# Patient Record
Sex: FEMALE | Race: WHITE | Employment: FULL TIME
[De-identification: names, ages, dates, MRNs, and addresses within clinical notes are randomized per-mention and may not be internally consistent; named-entity substitution may affect disease eponyms.]

---

## 2024-10-07 ENCOUNTER — HOSPITAL ENCOUNTER (OUTPATIENT)
Facility: HOSPITAL | Age: 44
Setting detail: RECURRING SERIES
Discharge: HOME | End: 2024-10-10
Payer: COMMERCIAL

## 2024-10-07 PROCEDURE — 97162 PT EVAL MOD COMPLEX 30 MIN: CPT

## 2024-10-07 NOTE — THERAPY EVALUATION
perform skills, and interest  Persons(s) to be included in education: patient (P)  Barriers to Learning/Limitations: none  Measures taken if barriers to learning present: n/a  Patient Goal (s): \"less pain, more flexibility\"  Patient Self Reported Health Status: fair  Rehabilitation Potential: good    Short Term Goals: To be accomplished in 2 WEEKS  1.  Pt will be educated in/compliant with appropriate HEP to decrease pain, increase ROM, increase strength and return pt to PLOF.    Status at last note/certification: issued at SOC  Current:     2. Pt will increase b/l SB ROM by >/= 10 deg in order to decrease neck pain with ADL's  Status at last note/certification: R 24 (p!), L 38  Current:     3. Pt will improve b/l AC to table to </= 2 inches for decreased postural strain with sitting/standing ADL's.  Status at last note/certification: b/l 4 inches  Current:       Long Term Goals: To be accomplished in 8 WEEKS  1. Pt will improve NDI score to </= 15% to demo a significant improvement in functional activity tolerance.  Status at last note/certification: 32  Current:    2. Pt will demonstrate good form with deep c/s neck flexor endurance >/=25\" for decreased cervicalgia with ADL's.  Status at last note/certification: 15\" with Fair- form (engages SCM immediately)  Current:     3. Pt will improve b/l mid/low trap strength by at least 1/3 MMT for improved axial stability with prolonged standing ADL's.  Status at last note/certification: Mid trap R 4/5, L 4/5, Low trap R 3/5, L 3+/5  Current:         Frequency / Duration: Patient to be seen 2 times per week for 8 WEEKS    Patient/ Caregiver education and instruction: Diagnosis, prognosis, self care, activity modification, and exercises [x]  Plan of care has been reviewed with PTA    Certification Period: n/a    DON JAQUEZ, PT       10/7/2024       8:20 AM    Payor: ROSI / Plan: [employer] / Product Type: *No Product type* /     No Physician

## 2024-10-07 NOTE — PROGRESS NOTES
PHYSICAL / OCCUPATIONAL THERAPY - DAILY TREATMENT NOTE (updated )  For Eval visit    Patient Name: Felicita Meyer    Date: 10/7/2024    : 1980  Insurance: Payor: ROSI / Plan: [employer] / Product Type: *No Product type* /      Patient  verified yes     Visit #   Current / Total 1 16   Time   In / Out 12:20 12:48   Pain   In / Out 2 2   Subjective Functional Status/Changes: See POC     TREATMENT AREA =  see POC    OBJECTIVE      28 min   Eval - untimed                      Therapeutic Procedures:    Tx Min Billable or 1:1 Min (if diff from Tx Min) Procedure, Rationale, Specifics   x  [id number] Self Care/Home Management (timed):  improve patient knowledge and understanding of diagnosis/prognosis and physical therapy expectations, procedures and progression  to improve patient's ability to progress to PLOF and address remaining functional goals.  (see flow sheet as applicable)     Details if applicable:              Details if applicable:            Details if applicable:            Details if applicable:       [facility] Totals Reminder: bill using total billable min of TIMED therapeutic procedures (example: do not include dry needle or estim unattended, both untimed codes, in totals to left)  8-22 min = 1 unit; 23-37 min = 2 units; 38-52 min = 3 units; 53-67 min = 4 units; 68-82 min = 5 units   Total Total     [x]  Patient Education billed concurrently with other procedures   [x] Review HEP    [] Progressed/Changed HEP, detail:    [] Other detail:       Objective Information/Functional Measures/Assessment    See POC    Patient will continue to benefit from skilled PT / OT services to modify and progress therapeutic interventions, analyze and address functional mobility deficits, analyze and address ROM deficits, analyze and address strength deficits, analyze and address soft tissue restrictions, analyze and cue for proper movement patterns, analyze and modify for postural abnormalities, and

## 2024-10-08 NOTE — PROGRESS NOTES
PHYSICAL / OCCUPATIONAL THERAPY - DAILY TREATMENT NOTE    Patient Name: Felicita Meyer    Date: 10/9/2024    : 1980  Insurance: Payor: ROSI / Plan: [employer] / Product Type: *No Product type* /      Patient  verified Yes     Visit #   Current / Total 2 16   Time   In / Out 1221 104   Pain   In / Out 3 3   Subjective Functional Status/Changes: Pt reports pain along the right side of her neck today. States she has been doing her exercises at home.     TREATMENT AREA =  Cervicalgia [M54.2]  Other low back pain [M54.59]     OBJECTIVE         Therapeutic Procedures:    Tx Min Billable or 1:1 Min (if diff from Tx Min) Procedure, Rationale, Specifics   27 27 [id number] Therapeutic Exercise (timed):  increase ROM, strength, coordination, balance, and proprioception to improve patient's ability to progress to PLOF and address remaining functional goals. (see flow sheet as applicable)     Details if applicable:       8 8 58743 Manual Therapy (timed):  decrease pain, increase ROM, increase tissue extensibility, and decrease trigger points to improve patient's ability to progress to PLOF and address remaining functional goals.  The manual therapy interventions were performed at a separate and distinct time from the therapeutic activities interventions . (see flow sheet as applicable)     Details if applicable:  STM R UT, unique, SOR in supine   8 8 64004 Self Care/Home Management (timed):  improve patient knowledge and understanding of physical therapy expectations, procedures and progression  to improve patient's ability to progress to PLOF and address remaining functional goals.  (see flow sheet as applicable)     Details if applicable:  Pt education, HEP review and demo          Details if applicable:            Details if applicable:     43 43 [facility] Totals Reminder: bill using total billable min of TIMED therapeutic procedures (example: do not include dry needle or estim unattended, both untimed

## 2024-10-09 ENCOUNTER — HOSPITAL ENCOUNTER (OUTPATIENT)
Facility: HOSPITAL | Age: 44
Setting detail: RECURRING SERIES
Discharge: HOME | End: 2024-10-12
Payer: COMMERCIAL

## 2024-10-09 PROCEDURE — 97140 MANUAL THERAPY 1/> REGIONS: CPT

## 2024-10-09 PROCEDURE — 97535 SELF CARE MNGMENT TRAINING: CPT

## 2024-10-09 PROCEDURE — 97110 THERAPEUTIC EXERCISES: CPT

## 2024-10-14 NOTE — PROGRESS NOTES
ADL's.  Status at last note/certification: 15\" with Fair- form (engages SCM immediately)  Current:     3. Pt will improve b/l mid/low trap strength by at least 1/3 MMT for improved axial stability with prolonged standing ADL's.  Status at last note/certification: Mid trap R 4/5, L 4/5, Low trap R 3/5, L 3+/5  Current:  10/15/24: Progressing, added prone M, T, Y    Next PN/ RC due 11/7/24   Auth due (visit number/ date) (10 visits; 1/4/25)    PLAN  - Continue Plan of Care  - Upgrade activities as tolerated    Aelx Linda PTA    10/15/2024    8:10 AM  If an interpreting service was utilized for treatment of this patient, the contents of this document represent the material reviewed with the patient via the .     Future Appointments   Date Time Provider Department Center   10/15/2024  1:00 PM Alex Linda PTA [facility]   10/18/2024  1:00 PM Alex Linda PTA [facility]   10/21/2024 12:20 PM Isauro Diaz, PT [facility]   10/23/2024 12:20 PM Hazel Cano PT [facility]   10/30/2024 12:20 PM Isauro Diaz PT [facility]   11/1/2024 12:20 PM Alex Linda PTA [facility]

## 2024-10-15 ENCOUNTER — HOSPITAL ENCOUNTER (OUTPATIENT)
Facility: HOSPITAL | Age: 44
Setting detail: RECURRING SERIES
Discharge: HOME | End: 2024-10-18
Payer: COMMERCIAL

## 2024-10-15 PROCEDURE — 97110 THERAPEUTIC EXERCISES: CPT

## 2024-10-15 PROCEDURE — 97140 MANUAL THERAPY 1/> REGIONS: CPT

## 2024-10-18 ENCOUNTER — APPOINTMENT (OUTPATIENT)
Facility: HOSPITAL | Age: 44
End: 2024-10-18
Payer: COMMERCIAL

## 2024-10-21 ENCOUNTER — HOSPITAL ENCOUNTER (OUTPATIENT)
Facility: HOSPITAL | Age: 44
Setting detail: RECURRING SERIES
Discharge: HOME | End: 2024-10-24
Payer: COMMERCIAL

## 2024-10-21 PROCEDURE — 97110 THERAPEUTIC EXERCISES: CPT

## 2024-10-21 PROCEDURE — 97140 MANUAL THERAPY 1/> REGIONS: CPT

## 2024-10-21 NOTE — PROGRESS NOTES
PHYSICAL / OCCUPATIONAL THERAPY - DAILY TREATMENT NOTE    Patient Name: Felicita Meyer    Date: 10/21/2024    : 1980  Insurance: Payor: ROSI / Plan: [employer] / Product Type: *No Product type* /      Patient  verified Yes     Visit #   Current / Total 4 16   Time   In / Out 1219 1249   Pain   In / Out 2/10 1/10   Subjective Functional Status/Changes: Patient reports she is feeling sore on the R side of her neck as well as the top of her shoulders. Patient denies complications since her LV. Patient requesting to be done with her appointment by 12:50 as she needs to go to a meeting.      TREATMENT AREA =  Cervicalgia [M54.2]  Other low back pain [M54.59]     OBJECTIVE         Therapeutic Procedures:    Tx Min Billable or 1:1 Min (if diff from Tx Min) Procedure, Rationale, Specifics   15 70 38948 Therapeutic Exercise (timed):  increase ROM, strength, coordination, balance, and proprioception to improve patient's ability to progress to PLOF and address remaining functional goals. (see flow sheet as applicable)     Details if applicable:       15 15 84314 Manual Therapy (timed):  decrease pain, increase ROM, increase tissue extensibility, and decrease trigger points to improve patient's ability to progress to PLOF and address remaining functional goals.  The manual therapy interventions were performed at a separate and distinct time from the therapeutic activities interventions . (see flow sheet as applicable)     Details if applicable:  STM to R cervical paraspinals, R scalenes, cervical spine joint mobs to promote rotation and SB; manual cervical traction all performed in supine          Details if applicable:            Details if applicable:            Details if applicable:     30 30 [facility] Totals Reminder: bill using total billable min of TIMED therapeutic procedures (example: do not include dry needle or estim unattended, both untimed codes, in totals to left)  8-22 min = 1 unit;

## 2024-10-22 NOTE — PROGRESS NOTES
PHYSICAL / OCCUPATIONAL THERAPY - DAILY TREATMENT NOTE    Patient Name: Felicita Meyer    Date: 10/23/2024    : 1980  Insurance: Payor: ROSI / Plan: [employer] / Product Type: *No Product type* /      Patient  verified Yes     Visit #   Current / Total 5 16   Time   In / Out 1220 105   Pain   In / Out 0 1   Subjective Functional Status/Changes: Pt reports no neck pain today. States it only hurts when she turns it to the side.     TREATMENT AREA =  Cervicalgia [M54.2]  Other low back pain [M54.59]     OBJECTIVE         Therapeutic Procedures:    Tx Min Billable or 1:1 Min (if diff from Tx Min) Procedure, Rationale, Specifics   35 35 26934 Therapeutic Exercise (timed):  increase ROM, strength, coordination, balance, and proprioception to improve patient's ability to progress to PLOF and address remaining functional goals. (see flow sheet as applicable)     Details if applicable:       10 10 26140 Manual Therapy (timed):  decrease pain, increase ROM, increase tissue extensibility, and decrease trigger points to improve patient's ability to progress to PLOF and address remaining functional goals.  The manual therapy interventions were performed at a separate and distinct time from the therapeutic activities interventions . (see flow sheet as applicable)     Details if applicable:  STM R UT, unique, SOR in supine           Details if applicable:            Details if applicable:            Details if applicable:     45 45 [facility] Totals Reminder: bill using total billable min of TIMED therapeutic procedures (example: do not include dry needle or estim unattended, both untimed codes, in totals to left)  8-22 min = 1 unit; 23-37 min = 2 units; 38-52 min = 3 units; 53-67 min = 4 units; 68-82 min = 5 units   Total Total     [x]  Patient Education billed concurrently with other procedures   [x] Review HEP    [] Progressed/Changed HEP, detail:    [] Other detail:       Objective

## 2024-10-23 ENCOUNTER — HOSPITAL ENCOUNTER (OUTPATIENT)
Facility: HOSPITAL | Age: 44
Setting detail: RECURRING SERIES
Discharge: HOME | End: 2024-10-26
Payer: COMMERCIAL

## 2024-10-23 PROCEDURE — 97140 MANUAL THERAPY 1/> REGIONS: CPT

## 2024-10-23 PROCEDURE — 97110 THERAPEUTIC EXERCISES: CPT

## 2024-10-30 ENCOUNTER — HOSPITAL ENCOUNTER (OUTPATIENT)
Facility: HOSPITAL | Age: 44
Setting detail: RECURRING SERIES
Discharge: HOME | End: 2024-11-02
Payer: COMMERCIAL

## 2024-10-30 PROCEDURE — 97530 THERAPEUTIC ACTIVITIES: CPT

## 2024-10-30 PROCEDURE — 97110 THERAPEUTIC EXERCISES: CPT

## 2024-10-30 PROCEDURE — 97140 MANUAL THERAPY 1/> REGIONS: CPT

## 2024-10-30 NOTE — PROGRESS NOTES
PHYSICAL / OCCUPATIONAL THERAPY - DAILY TREATMENT NOTE    Patient Name: Felicita Meyer    Date: 10/30/2024    : 1980  Insurance: Payor: ROSI / Plan: [employer] / Product Type: *No Product type* /      Patient  verified Yes     Visit #   Current / Total 6 16   Time   In / Out 1220 1259   Pain   In / Out 0/10 1/10   Subjective Functional Status/Changes: Patient reports her neck has been feeling better overall since starting therapy. Patient denies any complications since her LV.      TREATMENT AREA =  Cervicalgia [M54.2]  Other low back pain [M54.59]     OBJECTIVE         Therapeutic Procedures:    Tx Min Billable or 1:1 Min (if diff from Tx Min) Procedure, Rationale, Specifics   17 17 97473 Therapeutic Exercise (timed):  increase ROM, strength, coordination, balance, and proprioception to improve patient's ability to progress to PLOF and address remaining functional goals. (see flow sheet as applicable)     Details if applicable:       10 10 22444 Therapeutic Activity (timed):  use of dynamic activities replicating functional movements to increase ROM, strength, coordination, balance, and proprioception in order to improve patient's ability to progress to PLOF and address remaining functional goals.  (see flow sheet as applicable)     Details if applicable:      33809 Manual Therapy (timed):  decrease pain, increase ROM, increase tissue extensibility, and decrease trigger points to improve patient's ability to progress to PLOF and address remaining functional goals.  The manual therapy interventions were performed at a separate and distinct time from the therapeutic activities interventions . (see flow sheet as applicable)     Details if applicable:  STM to R cervical paraspinals, R scalenes; cervical spine rotation PROM; manual cervical traction all performed in supine          Details if applicable:            Details if applicable:     39 39 [facility] Totals Reminder: bill using

## 2024-11-01 ENCOUNTER — HOSPITAL ENCOUNTER (OUTPATIENT)
Facility: HOSPITAL | Age: 44
Setting detail: RECURRING SERIES
Discharge: HOME | End: 2024-11-04
Payer: COMMERCIAL

## 2024-11-01 PROCEDURE — 97530 THERAPEUTIC ACTIVITIES: CPT

## 2024-11-01 PROCEDURE — 97110 THERAPEUTIC EXERCISES: CPT

## 2024-11-01 PROCEDURE — 97140 MANUAL THERAPY 1/> REGIONS: CPT

## 2024-11-01 NOTE — PROGRESS NOTES
PHYSICAL / OCCUPATIONAL THERAPY - DAILY TREATMENT NOTE    Patient Name: Felicita Meyer    Date: 2024    : 1980  Insurance: Payor: ROSI / Plan: [employer] / Product Type: *No Product type* /      Patient  verified Yes     Visit #   Current / Total 7 16   Time   In / Out 1220 108   Pain   In / Out 0 0   Subjective Functional Status/Changes: Pt reports no neck pain today. Reports improvement since starting therapy.     TREATMENT AREA =  Cervicalgia [M54.2]  Other low back pain [M54.59]     OBJECTIVE         Therapeutic Procedures:    Tx Min Billable or 1:1 Min (if diff from Tx Min) Procedure, Rationale, Specifics   25 25 74646 Therapeutic Exercise (timed):  increase ROM, strength, coordination, balance, and proprioception to improve patient's ability to progress to PLOF and address remaining functional goals. (see flow sheet as applicable)     Details if applicable:  see flow sheet     15 15 62162 Therapeutic Activity (timed):  use of dynamic activities replicating functional movements to increase ROM, strength, coordination, balance, and proprioception in order to improve patient's ability to progress to PLOF and address remaining functional goals.  (see flow sheet as applicable)     Details if applicable:  see flow sheet   8 8 91609 Manual Therapy (timed):  decrease pain, increase ROM, increase tissue extensibility, and decrease trigger points to improve patient's ability to progress to PLOF and address remaining functional goals.  The manual therapy interventions were performed at a separate and distinct time from the therapeutic activities interventions . (see flow sheet as applicable)     Details if applicable:  STM to R cervical paraspinals, R scalenes; cervical spine rotation PROM; manual cervical traction all performed in supine           Details if applicable:            Details if applicable:     48 48 [facility] Totals Reminder: bill using total billable min of TIMED therapeutic

## 2024-11-06 ENCOUNTER — HOSPITAL ENCOUNTER (OUTPATIENT)
Facility: HOSPITAL | Age: 44
Setting detail: RECURRING SERIES
Discharge: HOME | End: 2024-11-09
Payer: COMMERCIAL

## 2024-11-06 PROCEDURE — 97140 MANUAL THERAPY 1/> REGIONS: CPT

## 2024-11-06 PROCEDURE — 97530 THERAPEUTIC ACTIVITIES: CPT

## 2024-11-06 PROCEDURE — 97110 THERAPEUTIC EXERCISES: CPT

## 2024-11-06 NOTE — PROGRESS NOTES
PHYSICAL / OCCUPATIONAL THERAPY - DAILY TREATMENT NOTE    Patient Name: Felicita Meyer    Date: 2024    : 1980  Insurance: Payor: ROSI / Plan: [employer] / Product Type: *No Product type* /      Patient  verified Yes     Visit #   Current / Total 8 16   Time   In / Out 1220 107   Pain   In / Out 3/10 2/10   Subjective Functional Status/Changes: Pt reported increased c/s pain over the weekend. States she does not know how the pain started but reports it could have been from BU KB carries added last visit.      TREATMENT AREA =  Cervicalgia [M54.2]  Other low back pain [M54.59]     OBJECTIVE         Therapeutic Procedures:    Tx Min Billable or 1:1 Min (if diff from Tx Min) Procedure, Rationale, Specifics   8 8 60581 Therapeutic Exercise (timed):  increase ROM, strength, coordination, balance, and proprioception to improve patient's ability to progress to PLOF and address remaining functional goals. (see flow sheet as applicable)     Details if applicable:  see flow sheet     31 47 51606 Therapeutic Activity (timed):  use of dynamic activities replicating functional movements to increase ROM, strength, coordination, balance, and proprioception in order to improve patient's ability to progress to PLOF and address remaining functional goals.  (see flow sheet as applicable)     Details if applicable:  Reassessment, NDI   8 8 39444 Manual Therapy (timed):  decrease pain, increase ROM, increase tissue extensibility, and decrease trigger points to improve patient's ability to progress to PLOF and address remaining functional goals.  The manual therapy interventions were performed at a separate and distinct time from the therapeutic activities interventions . (see flow sheet as applicable)     Details if applicable:  STM to R cervical paraspinals, R scalenes; cervical spine rotation PROM; manual cervical traction all performed in supine          Details if applicable:            Details if

## 2024-11-06 NOTE — THERAPY RECERTIFICATION
[facility]  [address] [place] [address]  Phone: [phone]   [phone]  PHYSICAL THERAPY PROGRESS NOTE  Patient Name: Felicita Meyer : 1980   Treatment/Medical Diagnosis: Cervicalgia [M54.2]  Other low back pain [M54.59]   Referral Source: Venita Aguilar,*     Date of Initial Visit: 2024 Attended Visits: 8 Missed Visits: 0     SUMMARY OF TREATMENT  Physical therapy has consisted of therapeutic exercises for increased c/s strength, ROM, and flexibility. Therapeutic activities performed to improve functional activities, model real life movements and performance specific to the patient's need with supervision to return the patient to their PLOF.  Manual therapy to c/s for decreased muscle hypertonicity and increased ROM/flexibility. Pt education provided regarding HEP.    CURRENT STATUS  Pt reports 30% improvement since starting therapy. Pt has attended 8 visits since start of therapy and has missed 0 visits. NDI has improved from 32% to 26% indicating an improvement in function. Pt has met 2 out of 6 goals and is making steady progress towards remaining goals. Objective measurements indicate improvements in c/s AROM and mid/low trap strength. Pt continues to have pain with c/s rotation AROM and reports c/s pain with ADL's. Pt can benefit from additional skilled therapy to increase functional strength and mobility and decrease pain for ease of ADL's and improved activity tolerance.    % improvement: 30%  Max pain 8/10  Avg pain 4/10  Min pain 3/10    Current improvements: Increased strength, increased mobility, decreased stiffness  Remaining functional limitations: Pain with activity, pain with turning to right, difficulty sleeping/laying down    Objective measures:  POSTURE/OBSERVATION: AC to table b/l 3 inches.     ROM   CERVICAL: Flex WNL, Ext 55 deg (p!). SB R 30 (p!), L 35 (p!).  ROT R 85 (p!), L 83     STRENGTH

## 2024-11-12 ENCOUNTER — HOSPITAL ENCOUNTER (OUTPATIENT)
Facility: HOSPITAL | Age: 44
Setting detail: RECURRING SERIES
Discharge: HOME | End: 2024-11-15
Payer: COMMERCIAL

## 2024-11-12 PROCEDURE — 97110 THERAPEUTIC EXERCISES: CPT

## 2024-11-12 PROCEDURE — 97112 NEUROMUSCULAR REEDUCATION: CPT

## 2024-11-12 NOTE — PROGRESS NOTES
PHYSICAL / OCCUPATIONAL THERAPY - DAILY TREATMENT NOTE    Patient Name: Felicita Meyer    Date: 2024    : 1980  Insurance: Payor: ROSI / Plan: [employer] / Product Type: *No Product type* /      Patient  verified Yes     Visit #   Current / Total 9 12   Time   In / Out 11:43 12:28   Pain   In / Out 2 1   Subjective Functional Status/Changes: \"My neck feels a little better than it did last week\". States she hurt it a bit last week by either sleeping wrong or doing an exercise. Pt c/o tight/pinching pain to R neck. Restricted R c/s ROT     TREATMENT AREA =  Cervicalgia [M54.2]  Other low back pain [M54.59]     OBJECTIVE      Therapeutic Procedures:    Tx Min Billable or 1:1 Min (if diff from Tx Min) Procedure, Rationale, Specifics   30 30 34418 Therapeutic Exercise (timed):  increase ROM, strength, coordination, balance, and proprioception to improve patient's ability to progress to PLOF and address remaining functional goals. (see flow sheet as applicable)     Details if applicable:  see flow sheet     15 53 88100 Neuromuscular Re-Education (timed):  improve balance, coordination, kinesthetic sense, posture, core stability and proprioception to improve patient's ability to develop conscious control of individual muscles and awareness of position of extremities in order to progress to PLOF and address remaining functional goals. (see flow sheet as applicable)     Details if applicable:     TC  15868 Manual Therapy (timed):  decrease pain, increase ROM, increase tissue extensibility, and decrease trigger points to improve patient's ability to progress to PLOF and address remaining functional goals.  The manual therapy interventions were performed at a separate and distinct time from the therapeutic activities interventions . (see flow sheet as applicable)     Details if applicable:  STM to R cervical paraspinals, R scalenes; cervical spine rotation PROM; manual cervical traction all

## 2024-11-18 ENCOUNTER — HOSPITAL ENCOUNTER (OUTPATIENT)
Facility: HOSPITAL | Age: 44
Setting detail: RECURRING SERIES
Discharge: HOME | End: 2024-11-21
Payer: COMMERCIAL

## 2024-11-18 PROCEDURE — 97112 NEUROMUSCULAR REEDUCATION: CPT

## 2024-11-18 PROCEDURE — 97140 MANUAL THERAPY 1/> REGIONS: CPT

## 2024-11-18 PROCEDURE — 97110 THERAPEUTIC EXERCISES: CPT

## 2024-11-18 NOTE — PROGRESS NOTES
PHYSICAL / OCCUPATIONAL THERAPY - DAILY TREATMENT NOTE    Patient Name: Felicita Meyer    Date: 2024    : 1980  Insurance: Payor: ROSI / Plan: [employer] / Product Type: *No Product type* /      Patient  verified Yes     Visit #   Current / Total 10 12   Time   In / Out 1:00 1:39   Pain   In / Out 1 0.5   Subjective Functional Status/Changes: Pt states she had noticeable improvement to her pain after last session. Has been working on her HEP. Will be seeing spine specialist to evaluate her scoliosis. She is hoping she may qualify for breast reduction surgery.      TREATMENT AREA =  Cervicalgia [M54.2]  Other low back pain [M54.59]     OBJECTIVE      Therapeutic Procedures:    Tx Min Billable or 1:1 Min (if diff from Tx Min) Procedure, Rationale, Specifics   110 Therapeutic Exercise (timed):  increase ROM, strength, coordination, balance, and proprioception to improve patient's ability to progress to PLOF and address remaining functional goals. (see flow sheet as applicable)     Details if applicable:  see flow sheet     10 10 11833 Neuromuscular Re-Education (timed):  improve balance, coordination, kinesthetic sense, posture, core stability and proprioception to improve patient's ability to develop conscious control of individual muscles and awareness of position of extremities in order to progress to PLOF and address remaining functional goals. (see flow sheet as applicable)     Details if applicable:     10 10 90047 Manual Therapy (timed):  decrease pain, increase ROM, increase tissue extensibility, and decrease trigger points to improve patient's ability to progress to PLOF and address remaining functional goals.  The manual therapy interventions were performed at a separate and distinct time from the therapeutic activities interventions . (see flow sheet as applicable)     Details if applicable:  STM to b/l cervical paraspinals, scalenes, UT, LS; manual b/l LS and

## 2024-11-25 NOTE — THERAPY RECERTIFICATION
[facility]  [address] [place] [address]  Phone: [phone]   [phone]  PHYSICAL THERAPY PROGRESS NOTE  Patient Name: Felicita Meyer : 1980   Treatment/Medical Diagnosis: Cervicalgia [M54.2]  Other low back pain [M54.59]   Referral Source: Venita Aguilar,*     Date of Initial Visit: 24 Attended Visits: 11 Missed Visits: 0     SUMMARY OF TREATMENT  Physical therapy has consisted of therapeutic exercises for increased c/s strength, ROM, and flexibility. Therapeutic activities performed to improve functional activities, model real life movements and performance specific to the patient's need with supervision to return the patient to their PLOF.  Manual therapy to c/s for decreased muscle hypertonicity and increased ROM/flexibility. Pt education provided regarding HEP.     CURRENT STATUS  Pt has been seen for 11 sessions of skilled PT to address R sided neck pain and thoracic pain. Pt has been able to demonstrate slow, steady progress with c/t and scapular stability as per ability to advance with ex's. Pt reporting significant pain reduction over the past 30 days however continues to have intermittent neck/mid back pain with normal ADL's. Pt will benefit from skilled progression of PT to attain LTG's and decrease impairment with normal activities.    % improvement: 80-90%  Max pain 3-4/10; upper shoulders/neck/mid back; described as \"heavy\"  Avg pain 1/10  Min pain 0/10    Current improvements:  pt reports decreased pain with turning head to R. Improved pain with sleeping/laying down. Improving deep c/s neck flexor strength/endurance (see LTG 4)  Remaining functional limitations:  Tightness to R neck with ADL's, neck pain with turning to right, mid-upper back pain towards the end of the day    Objective measures:  STRENGTH: Mid traps R 4+/5, L 4+/5. Low traps R 4-/5, L 4-/5   Serratus Ant 4+/5 b/l    THORACIC AROM: WNL

## 2024-11-25 NOTE — PROGRESS NOTES
functional movements to increase ROM, strength, coordination, balance, and proprioception in order to improve patient's ability to progress to PLOF and address remaining functional goals.  (see flow sheet as applicable)     Details if applicable:    -including thorough reassessment for PN   10 10 [id number] Manual Therapy (timed):  decrease pain, increase ROM, increase tissue extensibility, and decrease trigger points to improve patient's ability to progress to PLOF and address remaining functional goals.  The manual therapy interventions were performed at a separate and distinct time from the therapeutic activities interventions . (see flow sheet as applicable)     Details if applicable:  STM to b/l cervical paraspinals, scalenes, UT, LS; manual R LS and scalene stretching         Details if applicable:            Details if applicable:     44 44 MC BC Totals Reminder: bill using total billable min of TIMED therapeutic procedures (example: do not include dry needle or estim unattended, both untimed codes, in totals to left)  8-22 min = 1 unit; 23-37 min = 2 units; 38-52 min = 3 units; 53-67 min = 4 units; 68-82 min = 5 units   Total Total     [x]  Patient Education billed concurrently with other procedures   [x] Review HEP    [] Progressed/Changed HEP, detail:    [] Other detail:       Objective Information/Functional Measures/Assessment  See PN    Patient will continue to benefit from skilled PT / OT services to modify and progress therapeutic interventions, analyze and address functional mobility deficits, analyze and address ROM deficits, analyze and address strength deficits, and analyze and address soft tissue restrictions to address functional deficits and attain remaining goals.    Progress toward goals / Updated goals:  [x]  See Progress Note/Recertification      Next PN/ RC due 12/26/24   Auth due (visit number/ date) (15 visits; 1/13/25)    PLAN  - Continue Plan of Care  - Upgrade activities as

## 2024-11-26 ENCOUNTER — HOSPITAL ENCOUNTER (OUTPATIENT)
Facility: HOSPITAL | Age: 44
Setting detail: RECURRING SERIES
Discharge: HOME | End: 2024-11-29
Payer: COMMERCIAL

## 2024-11-26 PROCEDURE — 97110 THERAPEUTIC EXERCISES: CPT

## 2024-11-26 PROCEDURE — 97140 MANUAL THERAPY 1/> REGIONS: CPT

## 2024-11-26 PROCEDURE — 97530 THERAPEUTIC ACTIVITIES: CPT
